# Patient Record
(demographics unavailable — no encounter records)

---

## 2024-11-07 NOTE — REVIEW OF SYSTEMS
[Back Pain] : back pain [Skin Rash] : skin rash [Fever] : no fever [Chills] : no chills [Fatigue] : no fatigue [Discharge] : no discharge [Pain] : no pain [Redness] : no redness [Dryness] : no dryness  [Vision Problems] : no vision problems [Earache] : no earache [Hearing Loss] : no hearing loss [Nosebleed] : no nosebleeds [Sore Throat] : no sore throat [Chest Pain] : no chest pain [Palpitations] : no palpitations [Shortness Of Breath] : no shortness of breath [Wheezing] : no wheezing [Cough] : no cough [Abdominal Pain] : no abdominal pain [Nausea] : no nausea [Constipation] : no constipation [Diarrhea] : diarrhea [Vomiting] : no vomiting [Heartburn] : no heartburn [Melena] : no melena [Dysuria] : no dysuria [Incontinence] : no incontinence [Hematuria] : no hematuria [Frequency] : no frequency [Headache] : no headache [Dizziness] : no dizziness [Anxiety] : no anxiety [Depression] : no depression [Swollen Glands] : no swollen glands [FreeTextEntry4] : ear congestion  [FreeTextEntry9] : (+) lower back pain [de-identified] : (+) eczema of hands b/;

## 2024-11-07 NOTE — HISTORY OF PRESENT ILLNESS
[FreeTextEntry1] : Labwork [de-identified] : 60 year old female pt with PMH of HTN presents to the clinic for f/u labwork. Pt does not really exercise at home; mostly just chores and walking to work from [employer] to the office. Diet consists of english muffin, boiled eggs for breakfast. Lunch usually leftovers and dinners are small portions of white rice and protein. Eczema flareups of the hands returned during the summer but has it bad before covid. After covid it went away but has recently returned. Has betamethasone diproprionate ointment. Does not take meds for HTN, will discuss starting meds with him if she needs to. CTA scan was done in August with cardio and it was normal.   Pt sees ortho for lower back pain; last seen in August. Was told she has right-sided scoliosis.  Pt sees cardio, last vist was in dec 2023 covid antibodies since september, pt was having cold symptoms  vitd screening requested

## 2024-11-07 NOTE — HEALTH RISK ASSESSMENT
[No] : In the past 12 months have you used drugs other than those required for medical reasons? No [No falls in past year] : Patient reported no falls in the past year [0] : 2) Feeling down, depressed, or hopeless: Not at all (0) [Never] : Never [PHQ-2 Negative - No further assessment needed] : PHQ-2 Negative - No further assessment needed [[id number]] : 0

## 2024-11-07 NOTE — PHYSICAL EXAM
[No Acute Distress] : no acute distress [Well Nourished] : well nourished [Well Developed] : well developed [Well-Appearing] : well-appearing [Normal Sclera/Conjunctiva] : normal sclera/conjunctiva [PERRL] : pupils equal round and reactive to light [EOMI] : extraocular movements intact [Normal Outer Ear/Nose] : the outer ears and nose were normal in appearance [Normal Oropharynx] : the oropharynx was normal [Supple] : supple [Thyroid Normal, No Nodules] : the thyroid was normal and there were no nodules present [No Respiratory Distress] : no respiratory distress  [No Accessory Muscle Use] : no accessory muscle use [Clear to Auscultation] : lungs were clear to auscultation bilaterally [Normal Rate] : normal rate  [Regular Rhythm] : with a regular rhythm [Normal S1, S2] : normal S1 and S2 [No Edema] : there was no peripheral edema [Soft] : abdomen soft [Non Tender] : non-tender [Non-distended] : non-distended [Normal Bowel Sounds] : normal bowel sounds [No Spinal Tenderness] : no spinal tenderness [Grossly Normal Strength/Tone] : grossly normal strength/tone [No Focal Deficits] : no focal deficits [Normal Gait] : normal gait [Normal Affect] : the affect was normal [Normal Insight/Judgement] : insight and judgment were intact

## 2024-11-07 NOTE — ASSESSMENT
[FreeTextEntry1] : Health Maintenance  HTN - pt has elevated BP in office at 150/90; repeat BP was 145/80. refuses to takes BP medications at this time but states that she will discuss with her cardiologist if she needs to be started on one.  - plans to see cardio next month  Eczema  - pt having worsening eczema of her palms/dorsal fingers after starting back work - c/w on betamethasone diproprionate ointment.  Prediabetes - diet controlled - check labs  Hld - diet controlled  - check labs  Back pain - follows with [facility]  - xray showing degen changes and scoliosis - PT advised - trouble commuting to work - would like to work from home, letter to be written